# Patient Record
(demographics unavailable — no encounter records)

---

## 2024-10-23 NOTE — CONSULT LETTER
[Dear  ___] : Dear  [unfilled], [Courtesy Letter:] : I had the pleasure of seeing your patient, [unfilled], in my office today. [Sincerely,] : Sincerely, [FreeTextEntry2] : Miguel Chavez MD  [address], [address] [facility],  [place] [address] [FreeTextEntry1] : Jeny is a very pleasant 31-year-old female patient who was seen in our office today to review imaging findings in the context of an abnormal MRI scan of the cervical spine.  At this time, the patient has not changed significantly since her last visit.  The patient continues to experience chronic pain throughout her body with associated numbness and tingling in various places.  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  The patient is currently using ketamine lotions previously responded well to epidural injections.  Unfortunately, the patient has also developed osteoporosis and is now longer able to obtain further epidural injections.  The patient is awaiting authorization for a spinal cord stimulator through her pain management office.  On examination, the patient remains alert, oriented, and compliant with the exam.  The patient continues to demonstrate full strength in the upper and lower extremities bilaterally.  The patient has a very thin and asthenic but appearance.  The patient demonstrates good range of motion throughout.  The patient is accompanied with flexion/extension MRI scan of the cervical spine performed on September 3, 2024 which did not reveal any structural instability causing dynamic compression of the spinal cord.  The patient's previously noted posterior column lesion appears stable.  Taken together, I do not have a definitive diagnosis for the patient's spinal cord lesion.  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  I reassured the patient there is no dynamic compression of the spinal cord causing this problem and thus surgical intervention was not recommended.  Given the patient's complex pain history the patient was given information regarding a specialized pain unit in the city to help manage her symptoms better.  The patient has been recommended a follow-up MRI scan in a year to reevaluate this lesion but the patient is aware to contact our office sooner should the need arise. [FreeTextEntry3] : Horacio Park MD, PhD, FRCPSC   Attending Neurosurgeon  [address], [address]nd [place] [address]  Office: [phone]  Fax: [phone]

## 2024-10-23 NOTE — CONSULT LETTER
[Dear  ___] : Dear  [unfilled], [Courtesy Letter:] : I had the pleasure of seeing your patient, [unfilled], in my office today. [Sincerely,] : Sincerely, [FreeTextEntry2] : Miguel Chavez MD  [address] [facility], [address] [facility],  [place] [address] [FreeTextEntry1] : Jeny is a very pleasant 31-year-old female patient who was seen in our office today to review imaging findings in the context of an abnormal MRI scan of the cervical spine.  At this time, the patient has not changed significantly since her last visit.  The patient continues to experience chronic pain throughout her body with associated numbness and tingling in various places.  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  The patient is currently using ketamine lotions previously responded well to epidural injections.  Unfortunately, the patient has also developed osteoporosis and is now longer able to obtain further epidural injections.  The patient is awaiting authorization for a spinal cord stimulator through her pain management office.  On examination, the patient remains alert, oriented, and compliant with the exam.  The patient continues to demonstrate full strength in the upper and lower extremities bilaterally.  The patient has a very thin and asthenic but appearance.  The patient demonstrates good range of motion throughout.  The patient is accompanied with flexion/extension MRI scan of the cervical spine performed on September 3, 2024 which did not reveal any structural instability causing dynamic compression of the spinal cord.  The patient's previously noted posterior column lesion appears stable.  Taken together, I do not have a definitive diagnosis for the patient's spinal cord lesion.  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  I reassured the patient there is no dynamic compression of the spinal cord causing this problem and thus surgical intervention was not recommended.  Given the patient's complex pain history the patient was given information regarding a specialized pain unit in the city to help manage her symptoms better.  The patient has been recommended a follow-up MRI scan in a year to reevaluate this lesion but the patient is aware to contact our office sooner should the need arise. [FreeTextEntry3] : Horacio Park MD, PhD, FRCPSC   Attending Neurosurgeon  [address], [address] [place] [address]  Office: [phone]  Fax: [phone]